# Patient Record
Sex: FEMALE | Race: WHITE | URBAN - METROPOLITAN AREA
[De-identification: names, ages, dates, MRNs, and addresses within clinical notes are randomized per-mention and may not be internally consistent; named-entity substitution may affect disease eponyms.]

---

## 2017-04-25 ENCOUNTER — OFFICE VISIT (OUTPATIENT)
Dept: OBGYN | Facility: CLINIC | Age: 57
End: 2017-04-25
Payer: COMMERCIAL

## 2017-04-25 VITALS
WEIGHT: 186 LBS | OXYGEN SATURATION: 95 % | HEIGHT: 62 IN | HEART RATE: 92 BPM | SYSTOLIC BLOOD PRESSURE: 106 MMHG | DIASTOLIC BLOOD PRESSURE: 60 MMHG | BODY MASS INDEX: 34.23 KG/M2

## 2017-04-25 DIAGNOSIS — Z12.31 VISIT FOR SCREENING MAMMOGRAM: ICD-10-CM

## 2017-04-25 DIAGNOSIS — Z00.00 ROUTINE HISTORY AND PHYSICAL EXAMINATION OF ADULT: Primary | ICD-10-CM

## 2017-04-25 PROCEDURE — 99396 PREV VISIT EST AGE 40-64: CPT | Performed by: OBSTETRICS & GYNECOLOGY

## 2017-04-25 PROCEDURE — G0202 SCR MAMMO BI INCL CAD: HCPCS | Mod: TC

## 2017-04-25 NOTE — PROGRESS NOTES
"SUBJECTIVE:  Dona Whaley is a 56 year old ,  female,  P0 woman who presents for annual exam. Patient's last menstrual period was 2013.  Menopausal.  Current contraception: none  History of abnormal Pap smear: yes - repeat nl.  Regular self breast exam: Yes  Family history of breast cancer: no. Colon cancer no. Ovarian cancer no.  History of abnormal lipids: no      Past Medical History:   Diagnosis Date     Allergic rhinitis, cause unspecified      Contact dermatitis and other eczema, due to unspecified cause      Moderate persistent asthma        Past Surgical History:   Procedure Laterality Date     NO HISTORY OF SURGERY         Current Outpatient Prescriptions   Medication     DIPROLENE AF 0.05 % EX CREA     ADVAIR DISKUS 250/50     ALBUTEROL 90 MCG/ACT IN AERS     PATANOL 0.1 % OP SOLN     CALCIUM 500 SUPPLEMENT 1250 MG OR CHEW     MULTIVITAMIN TABS   OR     ASPIRIN 325 MG OR TBEC     No current facility-administered medications for this visit.      Allergies   Allergen Reactions     Erythromycin Nausea, Diarrhea and GI Disturbance       Social History   Substance Use Topics     Smoking status: Former Smoker     Packs/day: 0.50     Years: 2.00     Quit date: 1988     Smokeless tobacco: Never Used      Comment: 20 years ago     Alcohol use Yes      Comment: wine or mixed drink-1/day       Review of Systems    CONSTITUTIONAL:NEGATIVE  EYES: NEGATIVE  ENT/MOUTH: NEGATIVE  RESP: NEGATIVE  CV: NEGATIVE  GI: NEGATIVE  : NEGATIVE  MUSCULOSKELATAL: NEGATIVE  INTEGUMENTARY/SKIN: NEGATIVE  BREAST: NEGATIVE  NEURO: NEGATIVE.      OBJECTIVE:  /60 (BP Location: Right arm, Patient Position: Chair, Cuff Size: Adult Large)  Pulse 92  Ht 5' 2\" (1.575 m)  Wt 186 lb (84.4 kg)  LMP 2013  SpO2 95%  BMI 34.02 kg/m2  General appearance: Healthy.  Skin: Normal.  Mental Status: cooperative, normal affect, no gross thought process defects.  Thyroid: Normal to palpation, no enlargement or nodules " noted.  Breasts: Symmetric without mass tenderness or discharge.  Axillary nodes negative.  Lungs: Clear to auscultation.   Heart.: Normal rate and rhythm.  No murmurs, clicks or gallops.   Abdomen: BS active. Soft, non-tender, no masses or organomegaly.    Pelvis: normal external genitalia, normal groin lymphatics, normal urethral meatus, normal vaginal mucosa, normal cervix, normal adnexa, no masses or tenderness, uterus normal size and shape and uterus antiverted.   Extremities: Normal     ASSESSMENT:  Satisfactory annual gyn exam    PLAN:    1) Pap smear  2) Mammography, lipids at appropriate intervals        PE: reviewed health maintenance including diet, regular exercise and periodic exams.

## 2017-04-25 NOTE — MR AVS SNAPSHOT
"              After Visit Summary   2017    Dona Whaley    MRN: [id number]           Patient Information     Date Of Birth          1960        Visit Information        Provider Department      2017 3:30 PM Yonatan You MD [facility]        Today's Diagnoses     Routine history and physical examination of adult    -  1       Follow-ups after your visit        Who to contact     If you have questions or need follow up information about today's clinic visit or your schedule please contact [facility] directly at [phone].  Normal or non-critical lab and imaging results will be communicated to you by Rabbithart, letter or phone within 4 business days after the clinic has received the results. If you do not hear from us within 7 days, please contact the clinic through Rabbithart or phone. If you have a critical or abnormal lab result, we will notify you by phone as soon as possible.  Submit refill requests through [employer] or call your pharmacy and they will forward the refill request to us. Please allow 3 business days for your refill to be completed.          Additional Information About Your Visit        MyChart Information     [employer] lets you send messages to your doctor, view your test results, renew your prescriptions, schedule appointments and more. To sign up, go to [email or web address] . Click on \"Log in\" on the left side of the screen, which will take you to the Welcome page. Then click on \"Sign up Now\" on the right side of the page.     You will be asked to enter the access code listed below, as well as some personal information. Please follow the directions to create your username and password.     Your access code is: [email or web address]  Expires: 2017  3:49 PM     Your access code will  in 90 days. If you need help or a new code, please call your [facility] or [phone].        Care EveryWhere ID     This is your Care " "EveryWhere ID. This could be used by other organizations to access your [place] medical records  [id number]        Your Vitals Were     Pulse Height Last Period Pulse Oximetry BMI (Body Mass Index)       92 5' 2\" (1.575 m) 01/20/2013 95% 34.02 kg/m2        Blood Pressure from Last 3 Encounters:   04/25/17 106/60   02/09/16 126/78   03/20/14 110/70    Weight from Last 3 Encounters:   04/25/17 186 lb (84.4 kg)   02/09/16 178 lb (80.7 kg)   03/20/14 184 lb 14.4 oz (83.9 kg)              Today, you had the following     No orders found for display       Primary Care Provider Office Phone # Fax #    Liborio Dudley [phone]       [facility] [address] [facility] [address]        Thank you!     Thank you for choosing [facility]  for your care. Our goal is always to provide you with excellent care. Hearing back from our patients is one way we can continue to improve our services. Please take a few minutes to complete the written survey that you may receive in the mail after your visit with us. Thank you!             Your Updated Medication List - Protect others around you: Learn how to safely use, store and throw away your medicines at www.disposemymeds.org.          This list is accurate as of: 4/25/17  3:49 PM.  Always use your most recent med list.                   Brand Name Dispense Instructions for use    ADVAIR DISKUS 250/50      1 inhalation bid       albuterol 90 MCG/ACT inhaler      1-2 puffs Q 4-6 hrs prn       aspirin 325 MG EC tablet      1-2 prn       CALCIUM 500 SUPPLEMENT 1250 MG Chew      1 tab QD       DIPROLENE AF 0.05 % cream   Generic drug:  augmented betamethasone dipropionate     25 gm.    Apply to areas twice daily       MULTIVITAMIN TABS   OR      1 tab QD       PATANOL 0.1 % ophthalmic solution   Generic drug:  olopatadine      1-2 gtts ou prn         "

## 2017-04-25 NOTE — NURSING NOTE
"Chief Complaint   Patient presents with     Gyn Exam   last pap 2/2016-NIL  No concerns  Initial /60 (BP Location: Right arm, Patient Position: Chair, Cuff Size: Adult Large)  Pulse 92  Ht 5' 2\" (1.575 m)  Wt 186 lb (84.4 kg)  LMP 01/20/2013  SpO2 95%  BMI 34.02 kg/m2 Estimated body mass index is 34.02 kg/(m^2) as calculated from the following:    Height as of this encounter: 5' 2\" (1.575 m).    Weight as of this encounter: 186 lb (84.4 kg).  Medication Reconciliation: complete   Rosemary Srinivasan MA      "

## 2018-03-31 ENCOUNTER — TELEPHONE (OUTPATIENT)
Dept: OBGYN | Facility: CLINIC | Age: 58
End: 2018-03-31

## 2018-03-31 NOTE — TELEPHONE ENCOUNTER
3/31/2018    Call Regarding Preventive Health Screening Colonoscopy    Attempt 1    Message on voicemail     Comments:       Outreach   CC

## 2018-04-28 NOTE — TELEPHONE ENCOUNTER
4/28/2018    Call Regarding Preventive Health Screening Colonoscopy    Attempt 2    Message on voicemail     Comments:           Outreach   AT

## 2018-06-09 NOTE — TELEPHONE ENCOUNTER
6/9/2018    Call Regarding Preventive Health Screening Colonoscopy    Attempt 3    Message on voicemail    Comments:       Outreach   Madeleine Meraz

## 2018-08-29 ENCOUNTER — RADIANT APPOINTMENT (OUTPATIENT)
Dept: MAMMOGRAPHY | Facility: CLINIC | Age: 58
End: 2018-08-29
Attending: INTERNAL MEDICINE
Payer: COMMERCIAL

## 2018-08-29 DIAGNOSIS — Z12.31 VISIT FOR SCREENING MAMMOGRAM: ICD-10-CM

## 2018-08-29 PROCEDURE — 77067 SCR MAMMO BI INCL CAD: CPT | Mod: TC
